# Patient Record
Sex: MALE | Race: WHITE | Employment: OTHER | URBAN - METROPOLITAN AREA
[De-identification: names, ages, dates, MRNs, and addresses within clinical notes are randomized per-mention and may not be internally consistent; named-entity substitution may affect disease eponyms.]

---

## 2022-12-07 ENCOUNTER — HOSPITAL ENCOUNTER (OUTPATIENT)
Dept: LAB | Age: 77
Discharge: HOME | End: 2022-12-07
Payer: MEDICARE

## 2022-12-07 ENCOUNTER — OFFICE VISIT (OUTPATIENT)
Dept: HEMATOLOGY | Age: 77
End: 2022-12-07
Payer: MEDICARE

## 2022-12-07 VITALS
SYSTOLIC BLOOD PRESSURE: 127 MMHG | DIASTOLIC BLOOD PRESSURE: 61 MMHG | HEIGHT: 71 IN | WEIGHT: 185 LBS | BODY MASS INDEX: 25.9 KG/M2 | HEART RATE: 66 BPM | OXYGEN SATURATION: 98 % | TEMPERATURE: 96.9 F

## 2022-12-07 DIAGNOSIS — Z11.59 ENCOUNTER FOR SCREENING FOR OTHER VIRAL DISEASES: ICD-10-CM

## 2022-12-07 DIAGNOSIS — R74.8 ELEVATED LIVER ENZYMES: ICD-10-CM

## 2022-12-07 DIAGNOSIS — R74.8 ELEVATED LIVER ENZYMES: Primary | ICD-10-CM

## 2022-12-07 PROBLEM — E78.00 HYPERCHOLESTEROLEMIA: Status: ACTIVE | Noted: 2022-12-07

## 2022-12-07 LAB
ALBUMIN SERPL-MCNC: 4 G/DL (ref 3.4–5)
ALBUMIN/GLOB SERPL: 1.6 {RATIO} (ref 0.8–1.7)
ALP SERPL-CCNC: 209 U/L (ref 45–117)
ALT SERPL-CCNC: 31 U/L (ref 16–61)
ANION GAP SERPL CALC-SCNC: 3 MMOL/L (ref 3–18)
AST SERPL-CCNC: 22 U/L (ref 10–38)
BASOPHILS # BLD: 0 K/UL (ref 0–0.1)
BASOPHILS NFR BLD: 1 % (ref 0–2)
BILIRUB DIRECT SERPL-MCNC: 0.2 MG/DL (ref 0–0.2)
BILIRUB SERPL-MCNC: 0.6 MG/DL (ref 0.2–1)
BUN SERPL-MCNC: 15 MG/DL (ref 7–18)
BUN/CREAT SERPL: 15 (ref 12–20)
CALCIUM SERPL-MCNC: 9.4 MG/DL (ref 8.5–10.1)
CHLORIDE SERPL-SCNC: 105 MMOL/L (ref 100–111)
CO2 SERPL-SCNC: 31 MMOL/L (ref 21–32)
CREAT SERPL-MCNC: 0.99 MG/DL (ref 0.6–1.3)
DIFFERENTIAL METHOD BLD: ABNORMAL
EOSINOPHIL # BLD: 0.1 K/UL (ref 0–0.4)
EOSINOPHIL NFR BLD: 3 % (ref 0–5)
ERYTHROCYTE [DISTWIDTH] IN BLOOD BY AUTOMATED COUNT: 13.3 % (ref 11.6–14.5)
FERRITIN SERPL-MCNC: 73 NG/ML (ref 8–388)
GLOBULIN SER CALC-MCNC: 2.5 G/DL (ref 2–4)
GLUCOSE SERPL-MCNC: 97 MG/DL (ref 74–99)
HCT VFR BLD AUTO: 43.1 % (ref 36–48)
HGB BLD-MCNC: 14.4 G/DL (ref 13–16)
IMM GRANULOCYTES # BLD AUTO: 0 K/UL (ref 0–0.04)
IMM GRANULOCYTES NFR BLD AUTO: 0 % (ref 0–0.5)
IRON SATN MFR SERPL: 34 % (ref 20–50)
IRON SERPL-MCNC: 107 UG/DL (ref 50–175)
LYMPHOCYTES # BLD: 1.5 K/UL (ref 0.9–3.6)
LYMPHOCYTES NFR BLD: 34 % (ref 21–52)
MCH RBC QN AUTO: 32.5 PG (ref 24–34)
MCHC RBC AUTO-ENTMCNC: 33.4 G/DL (ref 31–37)
MCV RBC AUTO: 97.3 FL (ref 78–100)
MONOCYTES # BLD: 0.6 K/UL (ref 0.05–1.2)
MONOCYTES NFR BLD: 14 % (ref 3–10)
NEUTS SEG # BLD: 2.3 K/UL (ref 1.8–8)
NEUTS SEG NFR BLD: 48 % (ref 40–73)
NRBC # BLD: 0 K/UL (ref 0–0.01)
NRBC BLD-RTO: 0 PER 100 WBC
PLATELET # BLD AUTO: 143 K/UL (ref 135–420)
PLATELET COMMENTS,PCOM: ABNORMAL
PMV BLD AUTO: 11.9 FL (ref 9.2–11.8)
POTASSIUM SERPL-SCNC: 4.1 MMOL/L (ref 3.5–5.5)
PROT SERPL-MCNC: 6.5 G/DL (ref 6.4–8.2)
RBC # BLD AUTO: 4.43 M/UL (ref 4.35–5.65)
RBC MORPH BLD: ABNORMAL
SODIUM SERPL-SCNC: 139 MMOL/L (ref 136–145)
TIBC SERPL-MCNC: 313 UG/DL (ref 250–450)
WBC # BLD AUTO: 4.5 K/UL (ref 4.6–13.2)

## 2022-12-07 PROCEDURE — 86706 HEP B SURFACE ANTIBODY: CPT

## 2022-12-07 PROCEDURE — 80076 HEPATIC FUNCTION PANEL: CPT

## 2022-12-07 PROCEDURE — 86381 MITOCHONDRIAL ANTIBODY EACH: CPT

## 2022-12-07 PROCEDURE — 86704 HEP B CORE ANTIBODY TOTAL: CPT

## 2022-12-07 PROCEDURE — 83540 ASSAY OF IRON: CPT

## 2022-12-07 PROCEDURE — G8417 CALC BMI ABV UP PARAM F/U: HCPCS | Performed by: NURSE PRACTITIONER

## 2022-12-07 PROCEDURE — 87340 HEPATITIS B SURFACE AG IA: CPT

## 2022-12-07 PROCEDURE — 1123F ACP DISCUSS/DSCN MKR DOCD: CPT | Performed by: NURSE PRACTITIONER

## 2022-12-07 PROCEDURE — 82728 ASSAY OF FERRITIN: CPT

## 2022-12-07 PROCEDURE — 86803 HEPATITIS C AB TEST: CPT

## 2022-12-07 PROCEDURE — 1101F PT FALLS ASSESS-DOCD LE1/YR: CPT | Performed by: NURSE PRACTITIONER

## 2022-12-07 PROCEDURE — 86708 HEPATITIS A ANTIBODY: CPT

## 2022-12-07 PROCEDURE — 86015 ACTIN ANTIBODY EACH: CPT

## 2022-12-07 PROCEDURE — 86038 ANTINUCLEAR ANTIBODIES: CPT

## 2022-12-07 PROCEDURE — 85025 COMPLETE CBC W/AUTO DIFF WBC: CPT

## 2022-12-07 PROCEDURE — 99204 OFFICE O/P NEW MOD 45 MIN: CPT | Performed by: NURSE PRACTITIONER

## 2022-12-07 PROCEDURE — G8432 DEP SCR NOT DOC, RNG: HCPCS | Performed by: NURSE PRACTITIONER

## 2022-12-07 PROCEDURE — 80048 BASIC METABOLIC PNL TOTAL CA: CPT

## 2022-12-07 PROCEDURE — G0463 HOSPITAL OUTPT CLINIC VISIT: HCPCS | Performed by: NURSE PRACTITIONER

## 2022-12-07 PROCEDURE — G8536 NO DOC ELDER MAL SCRN: HCPCS | Performed by: NURSE PRACTITIONER

## 2022-12-07 PROCEDURE — G8427 DOCREV CUR MEDS BY ELIG CLIN: HCPCS | Performed by: NURSE PRACTITIONER

## 2022-12-07 PROCEDURE — 36415 COLL VENOUS BLD VENIPUNCTURE: CPT

## 2022-12-07 RX ORDER — CHOLECALCIFEROL (VITAMIN D3) 125 MCG
CAPSULE ORAL
COMMUNITY

## 2022-12-07 RX ORDER — METHYLCELLULOSE (WITH SUGAR)
POWDER IN PACKET (EA) ORAL
COMMUNITY

## 2022-12-07 RX ORDER — ACETAMINOPHEN 325 MG/1
500 TABLET ORAL
COMMUNITY

## 2022-12-07 RX ORDER — LANOLIN ALCOHOL/MO/W.PET/CERES
1000 CREAM (GRAM) TOPICAL DAILY
COMMUNITY

## 2022-12-07 RX ORDER — SIMVASTATIN 20 MG/1
TABLET, FILM COATED ORAL
COMMUNITY

## 2022-12-07 NOTE — PROGRESS NOTES
[facility], MD, Paul, Damon Wolf Dahl, [facility] [place], BARBIE Silver, [facility]-BC   Milvia Thompson, [facility]   Constance Olvera, FNP-REBECCA Du, FNP-C   Gean Cheadle, [facility]      [address]   at [address], [address] [place] [address], [facility] 22.   [phone]   FAX: [phone]   at [address], [address]   [phone]   FAX: [phone]       Patient Care Team:  Cece Munson MD as PCP - General (Family Medicine)      The clinicians listed above have asked me to see Benito Vasquez in consultation regarding elevated liver enzymes and its management. All medical records sent by the referring physicians were reviewed including imaging studies   and pathology. No medical records were available for review when the patient was here for the appointment. The patient is a 65 y.o. male who was found to have elevated alkaline phosphatase in 8/2022. Serologic evaluation for markers of chronic liver disease has either not been performed or the results are not available. Imaging of the liver was either not performed or the results are not available to me. The patient had not started any new medications within 3 months preceding the elevation in liver chemistries. The patient does not have any symptoms which could be attributed to the liver disorder. The patient is not experiencing the following symptoms which are commonly seen in this liver disorder: fatigue, pain in the right side over the liver    The patient completes all daily activities without any functional limitations. ASSESSMENT AND PLAN:  Elevated liver enzymes  Persistent elevation in alkaline phosphatase of unclear etiology at this time.       Have performed laboratory testing to monitor liver function and degree of liver injury. This included   BMP, hepatic panel, CBC with platelet count. Liver transaminases are normal.  ALP is elevated. Liver function is normal.  The platelet count is depressed. Based upon laboratory studies the patient does not appear to have significant liver injury. Serologic testing for causes of chronic liver disease was ordered. Will perform additional serologic tests to screen for other causes of chronic liver disease. The most likely causes for the liver chemistry abnormalities were discussed with the patient and include   genetic metabolic liver disorders    The need to perform an assessment of liver fibrosis was discussed with the patient. The Fibroscan can assess liver fibrosis and determine if a patient has advanced fibrosis or cirrhosis without the need for liver biopsy. This will be performed at the next office visit. If the Fibroscan suggests advanced fibrosis then a liver biopsy should be considered. The Fibroscan can be repeated annually or as often as clinically indicated to assess for fibrosis progression and/or regression. If the Fibroscan suggests there is none or minimal liver injury the patient will be monitored at 6 month intervals. If the liver enzymes remain normal and the liver stiffness by Fibroscan remains normal or near normal over the next 1-2 years than no further monitoring is needed. Will perform imaging of the liver with ultrasound. Screening for Hepatocellular Carcinoma  HCC screening is not necessary if the patient has no evidence of cirrhosis. Treatment of other medical problems in patients with chronic liver disease  There are no contraindications for the patient to take most medications that are necessary for treatment of other medical issues.     The patient can take any medications utilized for treatment of DM, statins to treat hypercholesterolemia    The patient consumes alcohol on a daily basis or has recently stopped consuming alcohol. Regular alcohol use increases the risk of toxicity from acetaminophen. This analgesic should be avoided until the patient has been abstinent from alcohol for 6 months. Counseling for alcohol in patients with chronic liver disease  The patient was counseled regarding alcohol consumption and the effect of alcohol on chronic liver disease. The patient does not consume any significant amount of alcohol. Vaccinations   The need for vaccination against viral hepatitis A and B will be assessed with serologic and instituted as appropriate. The patient has received 2 doses and the booster dose of COVID-19 vaccine. Routine vaccinations against other bacterial and viral agents can be performed as indicated. Annual flu vaccination should be administered if indicated. ALLERGIES  No Known Allergies    MEDICATIONS  Current Outpatient Medications   Medication Sig    Lactobac no.41/Bifidobact no.7 (PROBIOTIC-10 PO) Take  by mouth. omega-3/dha/epa/dpa/fish oil (OMEGA-3 2100 PO) Take  by mouth. cholecalciferol, vitamin D3, (Vitamin D3) 50 mcg (2,000 unit) tab Take  by mouth.    cyanocobalamin (Vitamin B-12) 1,000 mcg tablet Take 1,000 mcg by mouth daily. simvastatin (ZOCOR) 20 mg tablet Take  by mouth nightly. acetaminophen (TylenoL) 325 mg tablet Take 500 mg by mouth every four (4) hours as needed for Pain. Methylcellulose, with Sugar, (CitruceL, sucrose,) powd Take  by mouth. No current facility-administered medications for this visit. SYSTEM REVIEW NOT RELATED TO LIVER DISEASE OR REVIEWED ABOVE:  Constitution systems: Negative for fever, chills, weight gain, weight loss. Eyes: Negative for visual changes. ENT: Negative for sore throat, painful swallowing. Respiratory: Negative for cough, hemoptysis, SOB. Cardiology: Negative for chest pain, palpitations.   GI: Negative for constipation or diarrhea. : Negative for urinary frequency, dysuria, hematuria, nocturia. Skin: Negative for rash. Hematology: Negative for easy bruising, blood clots. Musculo-skelatal: Negative for back pain, muscle pain, weakness. Neurologic: Negative for headaches, dizziness, vertigo, memory problems not related to HE. Psychology: Negative for anxiety, depression. FAMILY HISTORY:  The patient has no knowledge of the father's medical condition. The mother  of unknown cause. There is no family history of liver disease. There is no family history of immune disorders. SOCIAL HISTORY:  The patient is . The patient has 2 children and 3 grandchildren. The patient has never used tobacco products. The patient consumes 1 alcoholic beverage per day week. The patient retired in 2007. PHYSICAL EXAMINATION:  Visit Vitals  /61   Pulse 66   Temp 96.9 °F (36.1 °C)   Ht 5' 11\" (1.803 m)   Wt 185 lb (83.9 kg)   SpO2 98%   BMI 25.80 kg/m²     General: No acute distress. Eyes: Sclera anicteric. ENT: No oral lesions. Thyroid normal.  Nodes: No adenopathy. Skin: No spider angiomata. No jaundice. No palmar erythema. Respiratory: Lungs clear to auscultation. Cardiovascular: Regular heart rate. No murmurs. No JVD. Abdomen: Soft non-tender. Liver size normal to percussion/palpation. Spleen not palpable. No obvious ascites. Extremities: No edema. No muscle wasting. No gross arthritic changes. Neurologic: Alert and oriented. Cranial nerves grossly intact. No asterixis.     LABORATORY STUDIES:  Liver [place] of [address] Units 2022   WBC 4.6 - 13.2 K/uL 4.5 (L)   ANC 1.8 - 8.0 K/UL 2.3   HGB 13.0 - 16.0 g/dL 14.4    - 420 K/uL 143   AST 10 - 38 U/L 22   ALT 16 - 61 U/L 31   Alk Phos 45 - 117 U/L 209 (H)   Bili, Total 0.2 - 1.0 MG/DL 0.6   Bili, Direct 0.0 - 0.2 MG/DL 0.2   Albumin 3.4 - 5.0 g/dL 4.0   BUN 7.0 - 18 MG/DL 15   Creat 0.6 - 1.3 MG/DL 0.99   Na 136 - 145 mmol/L 139   K 3.5 - 5.5 mmol/L 4.1   Cl 100 - 111 mmol/L 105   CO2 21 - 32 mmol/L 31   Glucose 74 - 99 mg/dL 97     SEROLOGIES:  Serologies Latest Ref Rng & Units 12/7/2022   Ferritin 8 - 388 NG/ML 73   Iron % Saturation 20 - 50 % 34     LIVER HISTOLOGY:  Not available or performed    ENDOSCOPIC PROCEDURES:  Not available or performed    RADIOLOGY:  Not available or performed    OTHER TESTING:  Not available or performed    FOLLOW-UP:  All of the issues listed above in the Assessment and Plan were discussed with the patient. All questions were answered. The patient expressed a clear understanding of the above. [facility] 87 in 4 weeks for Fibroscan and to review all data and determine the treatment plan.       EJ Dorsey-BC  [address] Liver [place] of [address]  [facility], [address]  [phone]

## 2022-12-08 LAB
HAV AB SER QL IA: NEGATIVE
HBV CORE AB SERPL QL IA: NEGATIVE
HBV SURFACE AB SER QL IA: NEGATIVE
HBV SURFACE AB SERPL IA-ACNC: 3.86 MIU/ML
HBV SURFACE AG SER QL: <0.1 INDEX
HBV SURFACE AG SER QL: NEGATIVE
HCV AB S/CO SERPL IA: <0.1 S/CO RATIO (ref 0–0.9)
HCV AB SERPL QL IA: NORMAL
HEP BS AB COMMENT,HBSAC: ABNORMAL

## 2022-12-09 LAB
ANA TITR SER IF: NEGATIVE {TITER}
MITOCHONDRIA M2 IGG SER-ACNC: <20 UNITS (ref 0–20)
SMA IGG SER-ACNC: 3 UNITS (ref 0–19)

## 2022-12-14 NOTE — PROGRESS NOTES
Labs reviewed. Alk phos elevated. Liver enzymes and function normal. All other labs were either normal and/or abnormal values were not clinically meaningful to patient's current visit.

## 2022-12-15 ENCOUNTER — TELEPHONE (OUTPATIENT)
Dept: HEMATOLOGY | Age: 77
End: 2022-12-15

## 2022-12-15 NOTE — TELEPHONE ENCOUNTER
----- Message from Marsha Hooker NP sent at 12/14/2022 12:56 PM EST -----  Labs reviewed. Alk phos elevated. Liver enzymes and function normal. All other labs were either normal and/or abnormal values were not clinically meaningful to patient's current visit.

## 2023-04-03 ENCOUNTER — OFFICE VISIT (OUTPATIENT)
Age: 78
End: 2023-04-03
Payer: MEDICARE

## 2023-04-03 VITALS
SYSTOLIC BLOOD PRESSURE: 136 MMHG | HEART RATE: 66 BPM | OXYGEN SATURATION: 98 % | WEIGHT: 186.25 LBS | BODY MASS INDEX: 25.98 KG/M2 | TEMPERATURE: 96.4 F | DIASTOLIC BLOOD PRESSURE: 78 MMHG

## 2023-04-03 DIAGNOSIS — R74.8 ELEVATED ALKALINE PHOSPHATASE LEVEL: Primary | ICD-10-CM

## 2023-04-03 PROCEDURE — 99213 OFFICE O/P EST LOW 20 MIN: CPT

## 2023-04-03 PROCEDURE — 99213 OFFICE O/P EST LOW 20 MIN: CPT | Performed by: NURSE PRACTITIONER

## 2023-04-03 PROCEDURE — 1123F ACP DISCUSS/DSCN MKR DOCD: CPT | Performed by: NURSE PRACTITIONER

## 2023-04-03 PROCEDURE — G8417 CALC BMI ABV UP PARAM F/U: HCPCS | Performed by: NURSE PRACTITIONER

## 2023-04-03 PROCEDURE — 4004F PT TOBACCO SCREEN RCVD TLK: CPT | Performed by: NURSE PRACTITIONER

## 2023-04-03 PROCEDURE — G8428 CUR MEDS NOT DOCUMENT: HCPCS | Performed by: NURSE PRACTITIONER

## 2023-04-03 PROCEDURE — 91200 LIVER ELASTOGRAPHY: CPT | Performed by: NURSE PRACTITIONER

## 2023-04-03 NOTE — PROGRESS NOTES
HE.  Psychology: Negative for anxiety, depression. FAMILY HISTORY:  The patient has no knowledge of the father's medical condition. The mother  of unknown cause. There is no family history of liver disease. There is no family history of immune disorders. SOCIAL HISTORY:  The patient is . The patient has 2 children and 3 grandchildren. The patient has never used tobacco products. The patient consumes 1 alcoholic beverage per day week. The patient retired in 2007. PHYSICAL EXAMINATION:  Visit Vitals  /78   Pulse 66   Temp (!) 96.4 °F (35.8 °C) (Tympanic)   Wt 186 lb 4 oz (84.5 kg)   SpO2 98%   BMI 25.98 kg/m²     General: No acute distress. Eyes: Sclera anicteric. ENT: No oral lesions. Thyroid normal.  Nodes: No adenopathy. Skin: No spider angiomata. No jaundice. No palmar erythema. Respiratory: Lungs clear to auscultation. Cardiovascular: Regular heart rate. No murmurs. No JVD. Abdomen: Soft non-tender. Liver size normal to percussion/palpation. Spleen not palpable. No obvious ascites. Extremities: No edema. No muscle wasting. No gross arthritic changes. Neurologic: Alert and oriented. Cranial nerves grossly intact. No asterixis. LABORATORY STUDIES:  Liver [place] of [address] Units 2022   WBC 4.6 - 13.2 K/uL 4.5 (L)   ANC 1.8 - 8.0 K/UL 2.3   HGB 13.0 - 16.0 g/dL 14.4    - 420 K/uL 143   AST 10 - 38 U/L 22   ALT 16 - 61 U/L 31   Alk Phos 45 - 117 U/L 209 (H)   Bili, Total 0.2 - 1.0 MG/DL 0.6   Bili, Direct 0.0 - 0.2 MG/DL 0.2   Albumin 3.4 - 5.0 g/dL 4.0   BUN 7.0 - 18 MG/DL 15   Creat 0.6 - 1.3 MG/DL 0.99   Na 136 - 145 mmol/L 139   K 3.5 - 5.5 mmol/L 4.1   Cl 100 - 111 mmol/L 105   CO2 21 - 32 mmol/L 31   Glucose 74 - 99 mg/dL 97     SEROLOGIES:  Serologies Latest Ref Rng & Units 2022   Hep B Surface Ag <1.00 Index <0.10 . . .    Hep B Core Ab, Total Negative   Negative   Hep B Surface Ab >10.0 mIU/mL

## 2023-05-09 ENCOUNTER — OFFICE VISIT (OUTPATIENT)
Age: 78
End: 2023-05-09
Payer: MEDICARE

## 2023-05-09 VITALS
BODY MASS INDEX: 25.98 KG/M2 | TEMPERATURE: 97 F | SYSTOLIC BLOOD PRESSURE: 142 MMHG | HEART RATE: 59 BPM | HEIGHT: 71 IN | DIASTOLIC BLOOD PRESSURE: 72 MMHG | OXYGEN SATURATION: 97 %

## 2023-05-09 DIAGNOSIS — R74.8 ELEVATED ALKALINE PHOSPHATASE LEVEL: Primary | ICD-10-CM

## 2023-05-09 PROCEDURE — G8427 DOCREV CUR MEDS BY ELIG CLIN: HCPCS | Performed by: NURSE PRACTITIONER

## 2023-05-09 PROCEDURE — 1036F TOBACCO NON-USER: CPT | Performed by: NURSE PRACTITIONER

## 2023-05-09 PROCEDURE — 99213 OFFICE O/P EST LOW 20 MIN: CPT | Performed by: NURSE PRACTITIONER

## 2023-05-09 PROCEDURE — G8417 CALC BMI ABV UP PARAM F/U: HCPCS | Performed by: NURSE PRACTITIONER

## 2023-05-09 PROCEDURE — 1123F ACP DISCUSS/DSCN MKR DOCD: CPT | Performed by: NURSE PRACTITIONER

## 2023-05-09 ASSESSMENT — PATIENT HEALTH QUESTIONNAIRE - PHQ9
2. FEELING DOWN, DEPRESSED OR HOPELESS: 0
SUM OF ALL RESPONSES TO PHQ QUESTIONS 1-9: 0
SUM OF ALL RESPONSES TO PHQ QUESTIONS 1-9: 0
SUM OF ALL RESPONSES TO PHQ9 QUESTIONS 1 & 2: 0
1. LITTLE INTEREST OR PLEASURE IN DOING THINGS: 0
SUM OF ALL RESPONSES TO PHQ QUESTIONS 1-9: 0
SUM OF ALL RESPONSES TO PHQ QUESTIONS 1-9: 0

## 2023-05-09 NOTE — PROGRESS NOTES
[facility], MD, FACP, [address], [place]      Katerine Oregon, PAPABLO Cervantes, AGPCNP-BC   Elmer Quintero, [facility]-AG   Ama Crane, FNROBERT-MARILEE Soliz, FNP-C   Leilani Cabot, AGPCNP-BC      [address]   at [address], [address]  22.   [phone]   FAX: [address]   at [address], [address] [place] [address], [address]   [phone]   FAX: [phone]       Patient Care Team:  Sandra Oden MD as PCP - General    Patient Active Problem List   Diagnosis    Hypercholesterolemia    Elevated alkaline phosphatase level       Marlea Felty is being seen at [address] for management of elevated alkaline phosphatase. The active problem list, all pertinent past medical history, medications, liver histology, radiologic findings, and laboratory findings related to the liver disorder were reviewed and discussed with the patient. The patient is a 77 y.o. male who was found to have elevated alkaline phosphatase in 8/2022. Serologic evaluation for markers of chronic liver disease was negative. Imaging of the liver was either not performed or the results are not available to me. Assessment of liver fibrosis with Fibroscan was performed in the office today. The result was 5.0 kPa which correlates with no fibrosis. The CAP score of 231 does not suggest hepatic steatosis. The patient does not have any symptoms which could be attributed to the liver disorder. The patient is not experiencing the following symptoms which are commonly seen in this liver disorder: fatigue, pain in the right side over the liver    The patient completes all daily activities without any functional limitations.       ASSESSMENT AND PLAN:  Elevated liver